# Patient Record
Sex: MALE | Race: WHITE
[De-identification: names, ages, dates, MRNs, and addresses within clinical notes are randomized per-mention and may not be internally consistent; named-entity substitution may affect disease eponyms.]

---

## 2020-06-19 ENCOUNTER — DASHBOARD ENCOUNTERS (OUTPATIENT)
Age: 85
End: 2020-06-19

## 2020-06-19 ENCOUNTER — OFFICE VISIT (OUTPATIENT)
Dept: URBAN - METROPOLITAN AREA CLINIC 105 | Facility: CLINIC | Age: 85
End: 2020-06-19
Payer: MEDICARE

## 2020-06-19 DIAGNOSIS — Z85.060 HISTORY OF MALIGNANT CARCINOID TUMOR OF SMALL INTESTINE: ICD-10-CM

## 2020-06-19 DIAGNOSIS — Z86.010 PERSONAL HISTORY OF COLONIC POLYPS: ICD-10-CM

## 2020-06-19 DIAGNOSIS — K59.01 CONSTIPATION: ICD-10-CM

## 2020-06-19 DIAGNOSIS — K62.89 PROCTALGIA: ICD-10-CM

## 2020-06-19 DIAGNOSIS — K80.20 CALCULUS OF GALLBLADDER WITHOUT CHOLECYSTITIS WITHOUT OBSTRUCTION: ICD-10-CM

## 2020-06-19 DIAGNOSIS — K64.8 INTERNAL HEMORRHOIDS: ICD-10-CM

## 2020-06-19 PROCEDURE — 99214 OFFICE O/P EST MOD 30 MIN: CPT | Performed by: INTERNAL MEDICINE

## 2020-06-19 PROCEDURE — G9903 PT SCRN TBCO ID AS NON USER: HCPCS | Performed by: INTERNAL MEDICINE

## 2020-06-19 PROCEDURE — G8420 CALC BMI NORM PARAMETERS: HCPCS | Performed by: INTERNAL MEDICINE

## 2020-06-19 PROCEDURE — G8427 DOCREV CUR MEDS BY ELIG CLIN: HCPCS | Performed by: INTERNAL MEDICINE

## 2020-06-19 RX ORDER — PANTOPRAZOLE SODIUM 40 MG/1
1 TABLET TABLET, DELAYED RELEASE ORAL ONCE A DAY
Status: ACTIVE | COMMUNITY

## 2020-06-19 RX ORDER — HYDROCORTISONE ACETATE 25 MG/1
1 SUPPOSITORY SUPPOSITORY RECTAL TWICE A DAY
Qty: 28 | Refills: 0 | OUTPATIENT
Start: 2020-06-19 | End: 2020-07-03

## 2020-06-19 RX ORDER — SERTRALINE HCL 100 MG
TABLET ORAL
Qty: 0 | Refills: 0 | Status: ACTIVE | COMMUNITY
Start: 1900-01-01

## 2020-06-19 RX ORDER — CHLORHEXIDINE GLUCONATE 4 %
LIQUID (ML) TOPICAL
Qty: 0 | Refills: 0 | Status: ACTIVE | COMMUNITY
Start: 1900-01-01

## 2020-06-19 RX ORDER — DOXEPIN HYDROCHLORIDE 75 MG/1
CAPSULE ORAL
Qty: 0 | Refills: 0 | Status: ACTIVE | COMMUNITY
Start: 1900-01-01

## 2020-06-19 RX ORDER — BUMETANIDE 1 MG/1
AS DIRECTED TABLET ORAL
Status: ACTIVE | COMMUNITY

## 2020-06-19 RX ORDER — ACETAMINOPHEN 325 MG/1
TAKE 1 - 2 TABLETS (325 - 650 MG) BY ORAL ROUTE EVERY 4-6 HOURS AS NEEDED TABLET, FILM COATED ORAL
Qty: 0 | Refills: 0 | Status: DISCONTINUED | COMMUNITY
Start: 1900-01-01

## 2020-06-19 RX ORDER — ALLOPURINOL 300 MG/1
1 TABLET TABLET ORAL ONCE A DAY
Status: ACTIVE | COMMUNITY

## 2020-06-19 RX ORDER — PILOCARPINE HYDROCHLORIDE 7.5 MG/1
1 TABLET TABLET, FILM COATED ORAL THREE TIMES A DAY
Status: ACTIVE | COMMUNITY

## 2020-06-19 RX ORDER — CARVEDILOL 3.12 MG/1
1 TABLET WITH FOOD TABLET, FILM COATED ORAL TWICE A DAY
Status: ACTIVE | COMMUNITY

## 2020-06-19 RX ORDER — FINASTERIDE 5 MG/1
1 TABLET TABLET, FILM COATED ORAL ONCE A DAY
Status: ACTIVE | COMMUNITY

## 2020-06-19 RX ORDER — GABAPENTIN 100 MG/1
TAKE 3 CAPSULES (300 MG) BY ORAL ROUTE 3 TIMES PER DAY CAPSULE ORAL
Qty: 0 | Refills: 0 | Status: DISCONTINUED | COMMUNITY
Start: 1900-01-01

## 2020-06-19 RX ORDER — SIMVASTATIN 10 MG/1
1 TABLET IN THE EVENING TABLET, FILM COATED ORAL ONCE A DAY
Refills: 0 | Status: ACTIVE | COMMUNITY
Start: 1900-01-01

## 2020-06-19 RX ORDER — FINASTERIDE 5 MG/1
TAKE 1 TABLET (5 MG) BY ORAL ROUTE ONCE DAILY TABLET, FILM COATED ORAL 1
Qty: 0 | Refills: 0 | Status: DISCONTINUED | COMMUNITY
Start: 1900-01-01

## 2020-06-19 NOTE — HPI-OTHER HISTORIES
The patient, who previously saw Dr. Mcdaniel on 4/2/20, presents on follow-up for hemorrhoids.  Today, the patient possibly notes a protrusion. He reports ano-rectal pain outside of a BM daily. He denies bleeding. He uses prep H daily before bed or in the morning (9-10x/week). She notes a benefit. He has a daily soft BM with complete evacuation. He previously used Miralax. He doesn't recall a history of seeing a colorectal surgeon (saw Dr. Olivier back in 2018) or prior hemorrhoidal treatment that I have records for. He does an Epsom salt water bath QD.

## 2020-06-19 NOTE — PHYSICAL EXAM HENT:
Head, normocephalic, atraumatic, Face, Face within normal limits, Ears, External ears within normal limits, Nose/Nasopharynx, External nose normal appearance, nares patent, no nasal discharge

## 2020-06-22 ENCOUNTER — TELEPHONE ENCOUNTER (OUTPATIENT)
Dept: URBAN - METROPOLITAN AREA CLINIC 92 | Facility: CLINIC | Age: 85
End: 2020-06-22

## 2020-06-25 PROBLEM — 70342003: Status: ACTIVE | Noted: 2020-06-24

## 2020-06-25 PROBLEM — 428283002: Status: ACTIVE | Noted: 2020-06-24
